# Patient Record
Sex: FEMALE | URBAN - METROPOLITAN AREA
[De-identification: names, ages, dates, MRNs, and addresses within clinical notes are randomized per-mention and may not be internally consistent; named-entity substitution may affect disease eponyms.]

---

## 2021-09-01 VITALS — HEIGHT: 62 IN

## 2021-09-01 RX ORDER — CHLORHEXIDINE GLUCONATE 213 G/1000ML
1 SOLUTION TOPICAL ONCE
Refills: 0 | Status: DISCONTINUED | OUTPATIENT
Start: 2021-09-07 | End: 2021-09-21

## 2021-09-01 NOTE — H&P ADULT - NSICDXPASTMEDICALHX_GEN_ALL_CORE_FT
PAST MEDICAL HISTORY:  CAD (coronary artery disease)     Dementia     Diabetes     Hyperlipidemia     Hypertension

## 2021-09-01 NOTE — H&P ADULT - HISTORY OF PRESENT ILLNESS
COVID:  Cardiologist:  Pharmacy:  Escort:    Confirm meds on arrival     Patient is a 83 y/o female with PMHx known CAD (s/p CABG), dementia, HTN, HLD, DM II HTN who presented to cardiologist Dr. Augustine c/o IVETTE.      Patient denies SOB, palpitations, syncope, PND/orthopnea, or LE edema. Also denies fever, chills, URI symptoms, cough, N/V/D, recent illness, travel, or sick contacts.     NST 06/03/2021: EF 68%, small area of mild ischemia in the inferior-lateral wall identified. Echo per MD note dilated LA, LVH, EF 52%, mild to moderate AR and thickened AV cusps with mild stenosis and regurgitation.      In light of patient's risk factors, CCS -- anginal symptoms, and abnormal NST patient is referred for cardiac catheterization with possible intervention if clinically indicated to r/o CAD.  COVID:  Cardiologist:  Pharmacy:  Escort:    Confirm meds on arrival     Patient is a 81 y/o female with PMHx known CAD (s/p CABG 2008, LIMA-LAD, SVG-OM, SVG-RPL), dementia, HTN, HLD, DM II HTN who presented to cardiologist Dr. Augustine c/o IVETTE.      Patient denies SOB, palpitations, syncope, PND/orthopnea, or LE edema. Also denies fever, chills, URI symptoms, cough, N/V/D, recent illness, travel, or sick contacts.     NST 06/03/2021: EF 68%, small area of mild ischemia in the inferior-lateral wall identified. Echo per MD note dilated LA, LVH, EF 52%, mild to moderate AR and thickened AV cusps with mild stenosis and regurgitation.      In light of patient's risk factors, CCS -- anginal symptoms, and abnormal NST patient is referred for cardiac catheterization with possible intervention if clinically indicated to r/o CAD.  COVID testing [place] 09/04  Cardiologist: Dr. Augustine  Pharmacy: [facility]  Escort: granddaughter    Hx obtained via granddaughter Zuly Brown reliable historian  Confirm meds on arrival   Patient with dementia cannot consent for herself per granddaughter     Patient is a 83 y/o female with PMHx known CAD (s/p CABG 2008, LIMA-LAD, SVG-OM, SVG-RPL), dementia (AOx2, self and place), HTN, HLD, DM II who presented to cardiologist Dr. Augustine c/o STEELE. Patient endorses intermittent episodes of exertional non-radiating chest pain sharp in nature associated with SOB upon ambulating 1-2 blocks, subsequently resolved with rest. Patient denies palpitations, syncope, PND/orthopnea, or LE edema. Also denies fever, chills, URI symptoms, cough, N/V/D, recent illness, travel, or sick contacts. NST 06/03/2021: EF 68%, small area of mild ischemia in the inferior-lateral wall identified. Echo per MD note dilated LA, LVH, EF 52%, mild to moderate AR and thickened AV cusps with mild stenosis and regurgitation.      In light of patient's risk factors, CCS III anginal symptoms, and abnormal NST patient is referred for cardiac catheterization with possible intervention if clinically indicated to r/o CAD.  COVID testing [place]y [place] 09/04 (-) in [facility]   Cardiologist: Dr. Evan Augustine  Pharmacy: [facility]  Escort: granddaughter    Hx obtained via granddaughter Zuly Brown reliable historian  Confirm meds on arrival   Patient with dementia cannot consent for herself per granddaughter     Patient is a 81 y/o female with PMHx known CAD (s/p CABG 2008, LIMA-LAD, SVG-OM, SVG-RPL), dementia (AOx2, self and place), HTN, HLD, DM II who presented to cardiologist Dr. Augustine c/o STEELE. Patient endorses intermittent episodes of exertional non-radiating chest pain sharp in nature associated with SOB upon ambulating 1-2 blocks, subsequently resolved with rest. Patient denies palpitations, syncope, PND/orthopnea, or LE edema. Also denies fever, chills, URI symptoms, cough, N/V/D, recent illness, travel, or sick contacts. NST 06/03/2021: EF 68%, small area of mild ischemia in the inferior-lateral wall identified. Echo per MD note dilated LA, LVH, EF 52%, mild to moderate AR and thickened AV cusps with mild stenosis and regurgitation.      In light of patient's risk factors, CCS III anginal symptoms, and abnormal NST patient is referred for cardiac catheterization with possible intervention if clinically indicated to r/o CAD.

## 2021-09-01 NOTE — H&P ADULT - ASSESSMENT
Patient is a 83 y/o female with PMHx known CAD (s/p CABG 2008, LIMA-LAD, SVG-OM, SVG-RPL), dementia (AOx2, self and place), HTN, HLD, DM II who presents for left heart cardiac catheterization due to patient's risk factors, CCS III anginal symptoms, and abnormal NST.       - EKG:  NSR @    - ASA:  III         Mallampati: II  - H/H stable:        Platelets/Coags stable. Cr:   - Patient denies hematochieza, hematuria, easy bruising, or signs of bleeding.   - Patient loaded with Plavix 600 mg PO x1 and Aspirin 81 mg PO x 1.   - Patient started on IV NS @ 50 cc/hr x 4 hours.   - Hgb a1c:      Started on:  - Patient is a suitable candidate for moderate sedation.     Risks & benefits of procedure and alternative therapy have been explained to the patient including but not limited to: allergic reaction, bleeding w/possible need for blood transfusion, infection, renal and vascular compromise, limb damage, arrhythmia, stroke, vessel dissection/perforation, Myocardial infarction, emergent CABG. Informed consent obtained and in chart.    Patient is a 83 y/o female with PMHx known CAD (s/p CABG 2008, LIMA-LAD, SVG-OM, SVG-RPL), dementia (AOx2, self and place), HTN, HLD, DM II who presents for left heart cardiac catheterization due to patient's risk factors, CCS III anginal symptoms, and abnormal NST.       - EKG:  Sinus bradycardia at 51 bpm, RBBB, t wave inversions in I, III, V4-V6  - ASA:  III         Mallampati: II  - H/H stable:        Platelets/Coags stable. Cr:   - Consent obtained by HCP, Daily Mendez (daughter and HCP) as patient has dementia   - Patient denies hematochieza, hematuria, easy bruising, or signs of bleeding.   - Patient loaded with Plavix 600 mg PO x1 and Aspirin 81 mg PO x 1.   - Patient started on IV NS @ 50 cc/hr x 4 hours.   - Hgb a1c:      Started on:  - Patient is a suitable candidate for moderate sedation.     Risks & benefits of procedure and alternative therapy have been explained to the patient including but not limited to: allergic reaction, bleeding w/possible need for blood transfusion, infection, renal and vascular compromise, limb damage, arrhythmia, stroke, vessel dissection/perforation, Myocardial infarction, emergent CABG. Informed consent obtained and in chart.    Patient is a 81 y/o female with PMHx known CAD (s/p CABG 2008, LIMA-LAD, SVG-OM, SVG-RPL), dementia (AOx2, self and place), HTN, HLD, DM II who presents for left heart cardiac catheterization due to patient's risk factors, CCS III anginal symptoms, and abnormal NST.       - EKG:  Sinus bradycardia at 51 bpm, RBBB, t wave inversions in I, III, V4-V6  - ASA:  III         Mallampati: II  - H/H stable as per Istats. Will only give Aspirin 325 mg PO x 1 as per Dr. Augustine. Will load in the room.   - Consent obtained by HCP, Daily Mendez (daughter and HCP) as patient has dementia   - Patient denies hematochieza, hematuria, easy bruising, or signs of bleeding.   - Patient loaded with Plavix 600 mg PO x1 and Aspirin 81 mg PO x 1.   - Patient started on IV NS @ 50 cc/hr x 4 hours.   - MISS started   - Patient is a suitable candidate for moderate sedation.     Risks & benefits of procedure and alternative therapy have been explained to the patient including but not limited to: allergic reaction, bleeding w/possible need for blood transfusion, infection, renal and vascular compromise, limb damage, arrhythmia, stroke, vessel dissection/perforation, Myocardial infarction, emergent CABG. Informed consent obtained and in chart.    Patient is a 83 y/o female with PMHx known CAD (s/p CABG 2008, LIMA-LAD, SVG-OM, SVG-RPL), dementia (AOx2, self and place), HTN, HLD, DM II who presents for left heart cardiac catheterization due to patient's risk factors, CCS III anginal symptoms, and abnormal NST.       - EKG:  Sinus bradycardia at 51 bpm, RBBB, t wave inversions in I, III, V4-V6  - ASA:  III         Mallampati: II  - H/H stable as per Istats. Will only give Aspirin 325 mg PO x 1 as per Dr. Augustine. Will load in the room.   - Consent obtained by HCP, Daily Mendez (daughter and HCP) as patient has dementia   - Patient denies hematochieza, hematuria, easy bruising, or signs of bleeding.   - Patient loaded with Aspirin 325 mg PO x 1 as patient states she has not taken it the past couple days. Will load in the room as per Dr. Augustine.   - Patient started on IV NS @ 50 cc/hr x 4 hours.   - MISS started   - Patient is a suitable candidate for moderate sedation.     Risks & benefits of procedure and alternative therapy have been explained to the patient including but not limited to: allergic reaction, bleeding w/possible need for blood transfusion, infection, renal and vascular compromise, limb damage, arrhythmia, stroke, vessel dissection/perforation, Myocardial infarction, emergent CABG. Informed consent obtained and in chart.

## 2021-09-07 ENCOUNTER — OUTPATIENT (OUTPATIENT)
Dept: OUTPATIENT SERVICES | Facility: HOSPITAL | Age: 86
LOS: 1 days | Discharge: HOME | End: 2021-09-07
Payer: MEDICARE

## 2021-09-07 DIAGNOSIS — Z95.1 PRESENCE OF AORTOCORONARY BYPASS GRAFT: Chronic | ICD-10-CM

## 2021-09-07 LAB
A1C WITH ESTIMATED AVERAGE GLUCOSE RESULT: 6 % — HIGH (ref 4–5.6)
ALBUMIN SERPL ELPH-MCNC: 3.9 G/DL — SIGNIFICANT CHANGE UP (ref 3.3–5)
ALP SERPL-CCNC: 46 U/L — SIGNIFICANT CHANGE UP (ref 40–120)
ALT FLD-CCNC: 13 U/L — SIGNIFICANT CHANGE UP (ref 10–45)
ANION GAP SERPL CALC-SCNC: 9 MMOL/L — SIGNIFICANT CHANGE UP (ref 5–17)
ANISOCYTOSIS BLD QL: SLIGHT — SIGNIFICANT CHANGE UP
AST SERPL-CCNC: 17 U/L — SIGNIFICANT CHANGE UP (ref 10–40)
BASO STIPL BLD QL SMEAR: PRESENT — SIGNIFICANT CHANGE UP
BASOPHILS # BLD AUTO: 0.05 K/UL — SIGNIFICANT CHANGE UP (ref 0–0.2)
BASOPHILS NFR BLD AUTO: 0.9 % — SIGNIFICANT CHANGE UP (ref 0–2)
BILIRUB SERPL-MCNC: 1 MG/DL — SIGNIFICANT CHANGE UP (ref 0.2–1.2)
BUN SERPL-MCNC: 15 MG/DL — SIGNIFICANT CHANGE UP (ref 7–23)
BURR CELLS BLD QL SMEAR: PRESENT — SIGNIFICANT CHANGE UP
CALCIUM SERPL-MCNC: 8.8 MG/DL — SIGNIFICANT CHANGE UP (ref 8.4–10.5)
CHLORIDE SERPL-SCNC: 108 MMOL/L — SIGNIFICANT CHANGE UP (ref 96–108)
CHOLEST SERPL-MCNC: 131 MG/DL — SIGNIFICANT CHANGE UP
CK MB CFR SERPL CALC: 1.3 NG/ML — SIGNIFICANT CHANGE UP (ref 0–6.7)
CK SERPL-CCNC: 51 U/L — SIGNIFICANT CHANGE UP (ref 25–170)
CO2 SERPL-SCNC: 26 MMOL/L — SIGNIFICANT CHANGE UP (ref 22–31)
CREAT SERPL-MCNC: 0.99 MG/DL — SIGNIFICANT CHANGE UP (ref 0.5–1.3)
DACRYOCYTES BLD QL SMEAR: SLIGHT — SIGNIFICANT CHANGE UP
EOSINOPHIL # BLD AUTO: 0.05 K/UL — SIGNIFICANT CHANGE UP (ref 0–0.5)
EOSINOPHIL NFR BLD AUTO: 0.9 % — SIGNIFICANT CHANGE UP (ref 0–6)
ESTIMATED AVERAGE GLUCOSE: 126 MG/DL — HIGH (ref 68–114)
GIANT PLATELETS BLD QL SMEAR: PRESENT — SIGNIFICANT CHANGE UP
GLUCOSE BLDC GLUCOMTR-MCNC: 145 MG/DL — HIGH (ref 70–99)
GLUCOSE SERPL-MCNC: 150 MG/DL — HIGH (ref 70–99)
HCT VFR BLD CALC: 29.3 % — LOW (ref 34.5–45)
HDLC SERPL-MCNC: 52 MG/DL — SIGNIFICANT CHANGE UP
HGB BLD-MCNC: 9.3 G/DL — LOW (ref 11.5–15.5)
HYPOCHROMIA BLD QL: SIGNIFICANT CHANGE UP
LIPID PNL WITH DIRECT LDL SERPL: 52 MG/DL — SIGNIFICANT CHANGE UP
LYMPHOCYTES # BLD AUTO: 1.44 K/UL — SIGNIFICANT CHANGE UP (ref 1–3.3)
LYMPHOCYTES # BLD AUTO: 26.8 % — SIGNIFICANT CHANGE UP (ref 13–44)
MANUAL SMEAR VERIFICATION: SIGNIFICANT CHANGE UP
MCHC RBC-ENTMCNC: 20.4 PG — LOW (ref 27–34)
MCHC RBC-ENTMCNC: 31.7 GM/DL — LOW (ref 32–36)
MCV RBC AUTO: 64.4 FL — LOW (ref 80–100)
MICROCYTES BLD QL: SLIGHT — SIGNIFICANT CHANGE UP
MONOCYTES # BLD AUTO: 0.33 K/UL — SIGNIFICANT CHANGE UP (ref 0–0.9)
MONOCYTES NFR BLD AUTO: 6.2 % — SIGNIFICANT CHANGE UP (ref 2–14)
NEUTROPHILS # BLD AUTO: 3.3 K/UL — SIGNIFICANT CHANGE UP (ref 1.8–7.4)
NEUTROPHILS NFR BLD AUTO: 61.6 % — SIGNIFICANT CHANGE UP (ref 43–77)
NON HDL CHOLESTEROL: 79 MG/DL — SIGNIFICANT CHANGE UP
NRBC # BLD: 1 /100 — HIGH (ref 0–0)
NRBC # BLD: SIGNIFICANT CHANGE UP /100 WBCS (ref 0–0)
OVALOCYTES BLD QL SMEAR: SLIGHT — SIGNIFICANT CHANGE UP
PLAT MORPH BLD: ABNORMAL
PLATELET # BLD AUTO: 212 K/UL — SIGNIFICANT CHANGE UP (ref 150–400)
POIKILOCYTOSIS BLD QL AUTO: SIGNIFICANT CHANGE UP
POLYCHROMASIA BLD QL SMEAR: SLIGHT — SIGNIFICANT CHANGE UP
POTASSIUM SERPL-MCNC: 4 MMOL/L — SIGNIFICANT CHANGE UP (ref 3.5–5.3)
POTASSIUM SERPL-SCNC: 4 MMOL/L — SIGNIFICANT CHANGE UP (ref 3.5–5.3)
PROT SERPL-MCNC: 6.5 G/DL — SIGNIFICANT CHANGE UP (ref 6–8.3)
RBC # BLD: 4.55 M/UL — SIGNIFICANT CHANGE UP (ref 3.8–5.2)
RBC # FLD: 17.3 % — HIGH (ref 10.3–14.5)
RBC BLD AUTO: ABNORMAL
SCHISTOCYTES BLD QL AUTO: SLIGHT — SIGNIFICANT CHANGE UP
SMUDGE CELLS # BLD: PRESENT — SIGNIFICANT CHANGE UP
SODIUM SERPL-SCNC: 143 MMOL/L — SIGNIFICANT CHANGE UP (ref 135–145)
TRIGL SERPL-MCNC: 134 MG/DL — SIGNIFICANT CHANGE UP
VARIANT LYMPHS # BLD: 3.6 % — SIGNIFICANT CHANGE UP (ref 0–6)
WBC # BLD: 5.36 K/UL — SIGNIFICANT CHANGE UP (ref 3.8–10.5)
WBC # FLD AUTO: 5.36 K/UL — SIGNIFICANT CHANGE UP (ref 3.8–10.5)

## 2021-09-07 PROCEDURE — 99152 MOD SED SAME PHYS/QHP 5/>YRS: CPT

## 2021-09-07 PROCEDURE — C1887: CPT

## 2021-09-07 PROCEDURE — 93459 L HRT ART/GRFT ANGIO: CPT

## 2021-09-07 PROCEDURE — 93010 ELECTROCARDIOGRAM REPORT: CPT

## 2021-09-07 PROCEDURE — 82962 GLUCOSE BLOOD TEST: CPT

## 2021-09-07 PROCEDURE — C1769: CPT

## 2021-09-07 PROCEDURE — 99153 MOD SED SAME PHYS/QHP EA: CPT

## 2021-09-07 PROCEDURE — 80053 COMPREHEN METABOLIC PANEL: CPT

## 2021-09-07 PROCEDURE — 82553 CREATINE MB FRACTION: CPT

## 2021-09-07 PROCEDURE — 82550 ASSAY OF CK (CPK): CPT

## 2021-09-07 PROCEDURE — 85025 COMPLETE CBC W/AUTO DIFF WBC: CPT

## 2021-09-07 PROCEDURE — 83036 HEMOGLOBIN GLYCOSYLATED A1C: CPT

## 2021-09-07 PROCEDURE — 93459 L HRT ART/GRFT ANGIO: CPT | Mod: 26

## 2021-09-07 PROCEDURE — 93005 ELECTROCARDIOGRAM TRACING: CPT

## 2021-09-07 PROCEDURE — 80061 LIPID PANEL: CPT

## 2021-09-07 PROCEDURE — C1894: CPT

## 2021-09-07 RX ORDER — DONEPEZIL HYDROCHLORIDE 10 MG/1
1 TABLET, FILM COATED ORAL
Qty: 0 | Refills: 0 | DISCHARGE

## 2021-09-07 RX ORDER — DEXTROSE 50 % IN WATER 50 %
25 SYRINGE (ML) INTRAVENOUS ONCE
Refills: 0 | Status: DISCONTINUED | OUTPATIENT
Start: 2021-09-07 | End: 2021-09-21

## 2021-09-07 RX ORDER — AMLODIPINE BESYLATE 2.5 MG/1
1 TABLET ORAL
Qty: 0 | Refills: 0 | DISCHARGE

## 2021-09-07 RX ORDER — EZETIMIBE 10 MG/1
1 TABLET ORAL
Qty: 0 | Refills: 0 | DISCHARGE

## 2021-09-07 RX ORDER — SODIUM CHLORIDE 9 MG/ML
500 INJECTION INTRAMUSCULAR; INTRAVENOUS; SUBCUTANEOUS
Refills: 0 | Status: DISCONTINUED | OUTPATIENT
Start: 2021-09-07 | End: 2021-09-07

## 2021-09-07 RX ORDER — ASPIRIN/CALCIUM CARB/MAGNESIUM 324 MG
1 TABLET ORAL
Qty: 0 | Refills: 0 | DISCHARGE

## 2021-09-07 RX ORDER — DEXTROSE 50 % IN WATER 50 %
12.5 SYRINGE (ML) INTRAVENOUS ONCE
Refills: 0 | Status: DISCONTINUED | OUTPATIENT
Start: 2021-09-07 | End: 2021-09-21

## 2021-09-07 RX ORDER — ATORVASTATIN CALCIUM 80 MG/1
1 TABLET, FILM COATED ORAL
Qty: 0 | Refills: 0 | DISCHARGE

## 2021-09-07 RX ORDER — GLUCAGON INJECTION, SOLUTION 0.5 MG/.1ML
1 INJECTION, SOLUTION SUBCUTANEOUS ONCE
Refills: 0 | Status: DISCONTINUED | OUTPATIENT
Start: 2021-09-07 | End: 2021-09-21

## 2021-09-07 RX ORDER — INSULIN LISPRO 100/ML
VIAL (ML) SUBCUTANEOUS ONCE
Refills: 0 | Status: DISCONTINUED | OUTPATIENT
Start: 2021-09-07 | End: 2021-09-21

## 2021-09-07 RX ORDER — CHOLECALCIFEROL (VITAMIN D3) 125 MCG
1 CAPSULE ORAL
Qty: 0 | Refills: 0 | DISCHARGE

## 2021-09-07 RX ORDER — BENAZEPRIL HYDROCHLORIDE 40 MG/1
1 TABLET ORAL
Qty: 0 | Refills: 0 | DISCHARGE

## 2021-09-07 RX ORDER — DEXTROSE 50 % IN WATER 50 %
15 SYRINGE (ML) INTRAVENOUS ONCE
Refills: 0 | Status: DISCONTINUED | OUTPATIENT
Start: 2021-09-07 | End: 2021-09-21

## 2021-09-07 RX ORDER — SODIUM CHLORIDE 9 MG/ML
500 INJECTION INTRAMUSCULAR; INTRAVENOUS; SUBCUTANEOUS
Refills: 0 | Status: DISCONTINUED | OUTPATIENT
Start: 2021-09-07 | End: 2021-09-21

## 2021-09-07 RX ORDER — METOPROLOL TARTRATE 50 MG
1 TABLET ORAL
Qty: 0 | Refills: 0 | DISCHARGE

## 2021-09-07 RX ORDER — SODIUM CHLORIDE 9 MG/ML
1000 INJECTION, SOLUTION INTRAVENOUS
Refills: 0 | Status: DISCONTINUED | OUTPATIENT
Start: 2021-09-07 | End: 2021-09-21

## 2021-09-07 RX ORDER — ISOSORBIDE MONONITRATE 60 MG/1
1 TABLET, EXTENDED RELEASE ORAL
Qty: 0 | Refills: 0 | DISCHARGE

## 2021-09-07 RX ORDER — GABAPENTIN 400 MG/1
1 CAPSULE ORAL
Qty: 0 | Refills: 0 | DISCHARGE

## 2021-09-07 RX ORDER — ASPIRIN/CALCIUM CARB/MAGNESIUM 324 MG
325 TABLET ORAL ONCE
Refills: 0 | Status: COMPLETED | OUTPATIENT
Start: 2021-09-07 | End: 2021-09-07

## 2021-09-07 RX ADMIN — SODIUM CHLORIDE 50 MILLILITER(S): 9 INJECTION INTRAMUSCULAR; INTRAVENOUS; SUBCUTANEOUS at 09:12

## 2021-09-07 RX ADMIN — Medication 325 MILLIGRAM(S): at 09:12

## 2021-09-07 NOTE — PROGRESS NOTE ADULT - SUBJECTIVE AND OBJECTIVE BOX
Interventional Cardiology PA SDA Discharge Note    Patient without complaints.    Afebrile, VSS    Ext:    		  		Left              Radial :   No hematoma,  No   bleeding, dressing; C/D/I      Pulses:    intact RAD/DP/PT?to baseline     A/P:    81 y/o female with PMHx known CAD (s/p CABG 2008, LIMA-LAD, SVG-OM, SVG-RPL), dementia (AOx2, self and place), HTN, HLD, DM II who presented to cardiologist Dr. Augustine c/o STEELE. Patient endorses intermittent episodes of exertional non-radiating chest pain sharp in nature associated with SOB upon ambulating 1-2 blocks, subsequently resolved with rest. Patient denies palpitations, syncope, PND/orthopnea, or LE edema. Also denies fever, chills, URI symptoms, cough, N/V/D, recent illness, travel, or sick contacts. NST 06/03/2021: EF 68%, small area of mild ischemia in the inferior-lateral wall identified. Echo per MD note dilated LA, LVH, EF 52%, mild to moderate AR and thickened AV cusps with mild stenosis and regurgitation.  In light of patient's risk factors, CCS III anginal symptoms, and abnormal NST patient is referred for cardiac catheterization with possible intervention if clinically indicated to r/o CAD.  Pt s/p diagnostic cath 9/7/21: LIMA-lad patent without significant disease, SVG-OM patent without significant disease. RCA small non-dominant, LM 20%, oLAD 20%, pLAD 90%, dLAD has competitive flow, pLcx 80%, LPDA 80% diffuse. EDP 14, EF 60%, trace-mild AI,, mild AS. Pt will continue with medical management.               1.	Stable for discharge as per attending Dr. Augustine after bed rest, pt voids, wrist stable and 30 minutes of ambulation.  2.	Follow-up with PMD/Cardiologist Dr Augustine in 1-2 weeks  3.	Discharged forms signed and copies in chart

## 2021-09-09 DIAGNOSIS — I35.1 NONRHEUMATIC AORTIC (VALVE) INSUFFICIENCY: ICD-10-CM

## 2021-09-09 DIAGNOSIS — R94.39 ABNORMAL RESULT OF OTHER CARDIOVASCULAR FUNCTION STUDY: ICD-10-CM

## 2021-09-09 DIAGNOSIS — Z95.1 PRESENCE OF AORTOCORONARY BYPASS GRAFT: ICD-10-CM

## 2021-09-09 DIAGNOSIS — I25.118 ATHEROSCLEROTIC HEART DISEASE OF NATIVE CORONARY ARTERY WITH OTHER FORMS OF ANGINA PECTORIS: ICD-10-CM

## 2022-06-09 NOTE — H&P ADULT - CV[facility] HE PE MLT D E PC
Patient presented with c/o shortness of breath with progressively worsening LE edema.  LE dopplers negative for DVT, CT Chest Angio negative for PE or aortic aneurysm with clear lungs.  No hypoxia on RA, no complaints of orthopnea.  Echo without evidence of heart failure.  Continue Lasix per cardiology. regular rate and rhythm